# Patient Record
Sex: MALE | Race: WHITE | URBAN - METROPOLITAN AREA
[De-identification: names, ages, dates, MRNs, and addresses within clinical notes are randomized per-mention and may not be internally consistent; named-entity substitution may affect disease eponyms.]

---

## 2020-08-03 ENCOUNTER — OFFICE VISIT (OUTPATIENT)
Dept: PEDIATRICS CLINIC | Age: 25
End: 2020-08-03
Payer: COMMERCIAL

## 2020-08-03 VITALS
SYSTOLIC BLOOD PRESSURE: 138 MMHG | WEIGHT: 142 LBS | HEART RATE: 67 BPM | HEIGHT: 72 IN | TEMPERATURE: 97.8 F | BODY MASS INDEX: 19.23 KG/M2 | DIASTOLIC BLOOD PRESSURE: 86 MMHG

## 2020-08-03 PROCEDURE — 99395 PREV VISIT EST AGE 18-39: CPT | Performed by: PEDIATRICS

## 2020-08-03 SDOH — HEALTH STABILITY: MENTAL HEALTH: RISK FACTORS RELATED TO TOBACCO: 0

## 2020-08-03 ASSESSMENT — PATIENT HEALTH QUESTIONNAIRE - PHQ9
1. LITTLE INTEREST OR PLEASURE IN DOING THINGS: 0
SUM OF ALL RESPONSES TO PHQ9 QUESTIONS 1 & 2: 0
SUM OF ALL RESPONSES TO PHQ QUESTIONS 1-9: 0
SUM OF ALL RESPONSES TO PHQ QUESTIONS 1-9: 0
2. FEELING DOWN, DEPRESSED OR HOPELESS: 0

## 2020-08-03 ASSESSMENT — ENCOUNTER SYMPTOMS
CHEST TIGHTNESS: 0
EYE PAIN: 0
SNORING: 0
DIARRHEA: 0
CONSTIPATION: 0
COUGH: 0
RHINORRHEA: 0
SHORTNESS OF BREATH: 0
SORE THROAT: 0
EYE DISCHARGE: 0
ABDOMINAL PAIN: 0
VOMITING: 0
EYE REDNESS: 0

## 2020-08-03 NOTE — PATIENT INSTRUCTIONS

## 2020-08-03 NOTE — PROGRESS NOTES
[facility]  [facility] ([place])  [address] [address]  Dept: [phone]    WELL CHILD EXAM    Opal Johnston is a 22 y.o. male here for well childor sports physical exam.      Current Outpatient Medications   Medication Sig Dispense Refill    cetirizine (ZYRTEC) 10 MG tablet Take 10 mg by mouth daily. No current facility-administered medications for this visit. No Known Allergies    Well Child Assessment:  History provided by: patient. Lives with: has his own home. (No concern)     Nutrition  Types of intake include cereals, cow's milk, fish, fruits, juices, eggs, meats and vegetables. Dental  The patient has a dental home. The patient brushes teeth regularly. Last dental exam was 6-12 months ago. Elimination  Elimination problems do not include constipation, diarrhea or urinary symptoms. Sleep  Average sleep duration is 8 hours. The patient does not snore. There are no sleep problems. Safety  There is no smoking in the home. Home has working smoke alarms? yes. Home has working carbon monoxide alarms? yes. There is no gun in home. Screening  There are no risk factors for hearing loss. There are no risk factors for anemia. There are no risk factors for dyslipidemia. There are no risk factors for tuberculosis. There are no risk factors for vision problems. There are no risk factors related to diet. There are no risk factors at school. There are risk factors for sexually transmitted infections. There are risk factors related to alcohol (occassionally). There are no risk factors related to relationships. There are no risk factors related to friends or family. There are no risk factors related to emotions. There are no risk factors related to drugs. There are no risk factors related to personal safety. There are no risk factors related to tobacco.   Social  Caregiver enjoys child: He is working at [employer] his work.         PAST MEDICAL HISTORY History reviewed. No pertinent past medical history. SURGICAL HISTORY    History reviewed. No pertinent surgical history. FAMILY HISTORY    Family History   Problem Relation Age of Onset    High Blood Pressure Father        CHART ELEMENTS REVIEWED    Immunizations, Growth Chart, Labs, Screening tests        No question data found. VACCINES  Immunization History   Administered Date(s) Administered    DTaP (Infanrix) 1995, 1995, 1995, 09/05/1996, 08/03/2000    HIB PRP-T (ActHIB, Hiberix) 09/05/1996    Hepatitis B Adult (Engerix-B) 1995, 1995, 1995    MMR 09/05/1996, 08/03/2000    Tdap (Boostrix, Adacel) 05/30/2013    Varicella (Varivax) 08/03/2000     History of previous adverse reactions to immunizations? no    REVIEW OF SYSTEMS   Review of Systems   Constitutional: Negative for activity change, appetite change, fatigue and fever. HENT: Negative for congestion, ear discharge, ear pain, hearing loss, rhinorrhea and sore throat. Eyes: Negative for pain, discharge and redness. Respiratory: Negative for snoring, cough, chest tightness and shortness of breath. Cardiovascular: Negative for chest pain, palpitations and leg swelling. Gastrointestinal: Negative for abdominal pain, constipation, diarrhea and vomiting. Endocrine: Negative for cold intolerance, heat intolerance, polydipsia, polyphagia and polyuria. Genitourinary: Negative for decreased urine volume, difficulty urinating, discharge and scrotal swelling. Musculoskeletal: Negative for gait problem, joint swelling and myalgias. Skin: Negative for pallor and rash. Allergic/Immunologic: Negative for environmental allergies. Neurological: Negative for dizziness, tremors, weakness and headaches. Hematological: Negative for adenopathy. Does not bruise/bleed easily. Psychiatric/Behavioral: Negative for dysphoric mood, self-injury, sleep disturbance and suicidal ideas.  The patient is not nervous/anxious. No history of SOB/CP/dizziness with activity. No fainting with activity. No family history of sudden death or heart attack before age 54. DEPRESSION SCREEN  PHQ-9 Total Score: 0 (8/3/2020  3:43 PM)        PHYSICAL EXAM   Wt Readings from Last 2 Encounters:   08/03/20 142 lb (64.4 kg)     /86 (Site: Right Upper Arm, Position: Sitting, Cuff Size: Small Adult)   Pulse 67   Temp 97.8 °F (36.6 °C) (Temporal)   Ht 6' (1.829 m)   Wt 142 lb (64.4 kg)   BMI 19.26 kg/m²     Physical Exam  Vitals signs and nursing note reviewed. Constitutional:       General: He is not in acute distress. Appearance: Normal appearance. He is well-developed and normal weight. HENT:      Head: Normocephalic. Jaw: There is normal jaw occlusion. Right Ear: Tympanic membrane and ear canal normal.      Left Ear: Tympanic membrane and ear canal normal.      Nose: Nose normal. No rhinorrhea. Mouth/Throat:      Lips: Pink. No lesions. Mouth: Mucous membranes are moist. No oral lesions. Dentition: No gum lesions. Tongue: No lesions. Palate: No lesions. Pharynx: Uvula midline. No posterior oropharyngeal erythema. Tonsils: No tonsillar exudate. Eyes:      General: No scleral icterus. Right eye: No discharge. Left eye: No discharge. Extraocular Movements: Extraocular movements intact. Conjunctiva/sclera: Conjunctivae normal.      Pupils: Pupils are equal, round, and reactive to light. Neck:      Musculoskeletal: Normal range of motion and neck supple. No neck rigidity or muscular tenderness. Thyroid: No thyromegaly. Comments: Non palpable thyroid  Cardiovascular:      Rate and Rhythm: Normal rate and regular rhythm. Pulses: Normal pulses. Heart sounds: Normal heart sounds. No murmur. Pulmonary:      Effort: Pulmonary effort is normal. No respiratory distress. Breath sounds: Normal breath sounds.    Chest: found for: HDL  No results found for: LDLCHOLESTEROL, LDLCALC  No results found for: LABVLDL, VLDL  No results found for: CHOLHDLRATIO    No results found for this visit on 08/03/20. IMPRESSION   Diagnosis Orders   1. Routine general medical examination at a health care facility         Chucho Lombardo. up to date and documented   Immunizations given today: no   Side effects and Benefits of vaccinations and it's component discussed with caregiver. They understand and agreed. Preventive Plan/anticipatory guidance: Discussed the following with patient and parent(s)/guardian and educational materials provided:     [x]Nutrition/feeding- eat 5 fruits/veg daily, limit fried foods, fast food, junk food and sugary drinks, Drink water or fat free milk (20-24 ounces daily to getrecommended calcium)   [x]  Participate in> 1 hour of physical activity or active play daily   [x]  Avoid direct sunlight, sun protective clothing, sunscreen   [x]  Safety in the car: Seatbeltuse, never enter car if  is under the influence of alcohol or drugs, once one earns their license: never using phone/texting while driving   [x]  Importance of caring/supportive relationships with family and friends   [x]  Importance of reporting bullying, stalking, abuse, and anythreat to one's safety ASAP   [x]  Importance of appropriate sleep amount and sleep hygiene   [x]  Importance of responsibility with school work; impact on one's future   [x]  Proper dental care. [x] Signs of depression and anxiety; Importance of reaching out for help if one ever develops these signs   [x]  Age/experience appropriate counseling concerning sexual, STD and pregnancy prevention, peer pressure, drug/alcohol/tobacco use, prevention strategy: to prevent making decisions one will laterregret   [x]  Normal development       Orders:  No orders of the defined types were placed in this encounter.     Medications:  No orders of the defined types were placed in this encounter. Return in about 1 year (around 8/3/2021) for for check up.     Electronically signed by Sugey Lundy MD on 8/3/2020